# Patient Record
Sex: FEMALE | Race: WHITE | NOT HISPANIC OR LATINO | Employment: STUDENT | URBAN - METROPOLITAN AREA
[De-identification: names, ages, dates, MRNs, and addresses within clinical notes are randomized per-mention and may not be internally consistent; named-entity substitution may affect disease eponyms.]

---

## 2017-12-18 ENCOUNTER — ALLSCRIPTS OFFICE VISIT (OUTPATIENT)
Dept: OTHER | Facility: OTHER | Age: 22
End: 2017-12-18

## 2017-12-19 NOTE — PROGRESS NOTES
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)    Plan  Contraceptive surveillance    · Lessina 0 1-20 MG-MCG Oral Tablet; Take one tablet daily   Rx By: Crystal Enrique; Dispense: 0 Days ; #:84 Tablet; Refill: 3;For: Contraceptive surveillance; KERRI = N; Sent To: [facility]/PHARMACY #[id number] Uterus didelphys    · 3 - Budinetz DO, Shane Christmas (Obstetrics/Gynecology) Co-Management  *  Status: Hold For -Scheduling  Requested for: 99QKF5946   Ordered; For: Uterus didelphys; Ordered By: Crystal Enrique Performed:  Due: 40KYY7086  are Referring to a non- Preferred Provider : Scheduling access issues  Care Summary provided  : Yes  Well female exam with routine gynecological exam    · Call [phone] if: You find a new or different kind of lump in your breast ;Status:Complete;   Done: 95XET1331 03:36PM   Ordered; For:Well female exam with routine gynecological exam; Ordered By:Kylah Shipley;   · Always use a seat belt and shoulder strap when riding or driving a motor vehicle  ;Status:Complete;   Done: 76OWZ3042 03:36PM   Ordered; For:Well female exam with routine gynecological exam; Ordered By:Kylah Shipley;   · Avoid foods that are most likely to contain mercury ; Status:Complete;   Done:36Igq3897 03:36PM   Ordered; For:Well female exam with routine gynecological exam; Ordered By:Kylah Shipley;   · Begin or continue regular aerobic exercise  Gradually work up to at least 3 sessions of 30minutes of exercise a week ; Status:Complete;   Done: 87NVN3111 03:36PM   Ordered; For:Well female exam with routine gynecological exam; Ordered By:Kylah Shipley;   · Brush your teeth 3 times a day and floss at least once a day ; Status:Complete;   Done:82Jpi5389 03:36PM   Ordered; For:Well female exam with routine gynecological exam; Ordered By:Kylah Shipley;   · Drink plenty of fluids ; Status:Complete;   Done: 03OSU2391 03:36PM   Ordered; For:Well female exam with routine gynecological exam; Ordered By:Kylah Shipley;   · Regular aerobic exercise can help reduce stress ; Status:Complete;   Done: 65UGB670152:05UG   Ordered; For:Well female exam with routine gynecological exam; Ordered By:Kylah Shipley;   · Stretch and warm up your muscles during the first 10 minutes , then cool down yourmuscles for the last 10 minutes of exercise ; Status:Complete;   Done: 30UUQ070434:99PB   Ordered; For:Well female exam with routine gynecological exam; Ordered By:Kylah Shipley;   · There are many ways to reduce your risk of catching or spreading a sexually transmittedInfection ; Status:Complete;   Done: 81PEM5570 03:36PM   Ordered; For:Well female exam with routine gynecological exam; Ordered By:Kylah Shipley;   · Use a sun block product with an SPF of 15 or more ; Status:Complete;   Done:65Irb7106 03:36PM   Ordered; For:Well female exam with routine gynecological exam; Ordered By:Kylah Shipley;   · Vitamins can help you get daily requirements that your diet may not be giving you  ;Status:Complete;   Done: 09XFU3919 03:36PM   Ordered; For:Well female exam with routine gynecological exam; Ordered By:Kylah Shipley;   · We encourage all of our patients to exercise regularly  30 minutes of exercise or physicalactivity five or more days a week is recommended for children and adults  ;Status:Complete;   Done: 26CSE0872 03:36PM   Ordered; For:Well female exam with routine gynecological exam; Ordered By:Kylah Shipley;   · We recommend regular contraceptive use to prevent an unplanned pregnancy  ;Status:Complete;   Done: 50UOL7523 03:36PM   Ordered; For:Well female exam with routine gynecological exam; Ordered By:Kylah Shipley;   · We recommend routine visits to a dentist ; Status:Complete;   Done: 56KQS846259:37HS   Ordered; For:Well female exam with routine gynecological exam; Ordered By:Kylah Shipley;   · We recommend that you follow these rules for gun safety ; Status:Complete;   Done:79Koa5235 03:36PM   Ordered; For:Well female exam with routine gynecological exam; Ordered By:Kylah Shipley;   · We recommend that you make the following changes in your diet to help prevent orreduce symptoms of PMS ; Status:Complete;   Done: 32PAF9743 03:36PM   Ordered; For:Well female exam with routine gynecological exam; Ordered By:Kylah Shipley;   · We recommend you eat foods that will give you 350 mg of magnesium each day  ;Status:Complete;   Done: 80PJL1680 03:36PM   Ordered; For:Well female exam with routine gynecological exam; Ordered By:Kylah Shipley;   · Follow-up visit in 1 year Evaluation and Treatment  Follow-up  Status: Hold For -Scheduling  Requested for: 74GIV6147   Ordered; For: Well female exam with routine gynecological exam; Ordered By: Melo Mosqueda Performed:  Due: 61IJH3211    Discussion/Summary  healthy adult female Currently, she eats an adequate diet and has an adequate exercise regimen  the risks and benefits of cervical cancer screening were discussed next cervical cancer screening is due 2019 Breast cancer screening: the risks and benefits of breast cancer screening were discussed and monthly self breast exam was advised  Advice and education were given regarding nutrition, aerobic exercise, reproductive health and contraception  Patient discussion: discussed with the patient  Schedule consultation with Dr Ilsa Brown regarding vaginal septum  Patient is able to Self-Care  Possible side effects of new medications were reviewed with the patient/guardian today  The treatment plan was reviewed with the patient/guardian   The patient/guardian understands and agrees with the treatment plan      Chief Complaint  pt here for yearly, would like to discuss Gardasil 9      History of Present Illness  GYN HM, Adult Female [facility] Sue: The patient is being seen for a health maintenance and yearly evaluation  The last health maintenance visit was 1 year(s) ago  General Health:  Lifestyle:  She exercises regularly  -- She does not use tobacco -- She denies alcohol use  -- She denies drug use  Reproductive health: the patient is premenopausal--   she reports no menstrual problems  Menstrual history: LMP: the last menstrual period was 12/05/2017  Recent menstrual periods: bleeding has been normal  The cycles have been regular  The duration of her recent periods has been regular  -- she uses contraception  For contraception, she uses oral contraception pills  -- she is sexually active  -- she denies prior pregnancies G 0  Screening: Cervical cancer screening includes a pap smear performed last year,-- no previous human papilloma virus screening-- and-- no previous colposcopy  Breast cancer screening includes no previous mammogram,-- a clinical breast exam performed last year-- and-- no breast self-exams  Colorectal cancer screening includes no previous colonoscopy  She hasn't been previously screened for colorectal cancer  Additional History:  Doing well on OCPs  Interested in [address]  Discussed risks and benefits  Handout given to patient  Patient will consider getting this in the near future, she is living in [place] for school so may get it done down there  Has uterine didelphys  Discussed this diagnosis as well as the vaginal septum  Given referral for Dr Ilsa Brown for evaluation for possible surgical intervention  Patient states intercourse is painful  Review of Systems   Constitutional: not feeling poorly-- and-- not feeling tired  Breasts: no complaints of breast pain, breast lump or nipple discharge  Gastrointestinal: no complaints of abdominal pain, no constipation, no nausea or diarrhea, no vomiting, no bloody stools    Genitourinary: no complaints of dysuria, no incontinence, no pelvic pain, no dysmenorrhea, no vaginal discharge or abnormal vaginal bleeding  Neurological: no headache  ROS reviewed  OB History  Pregnancy History (Brief):  Prior pregnancies: : 0  Para: Active Problems  1  Contraceptive surveillance (V25 40) (Z30 40)   2  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)   3  Well female exam with routine gynecological exam (V72 31) (Z01 419)    Past Medical History   · History of dysmenorrhea (V13 29) (Z87 42)   · History of menorrhagia (V13 29) (Z87 42)    The active problems and past medical history were reviewed and updated today  Surgical History    The surgical history was reviewed and updated today  Family History  Mother    · No pertinent family history  Grandparent    · Family history of cardiac disorder (V17 49) (Z82 49)   · Family history of cerebrovascular accident (V17 1) (Z82 3)   · Family history of diabetes mellitus (V18 0) (Z83 3)  Family History    · Family history of cardiac disorder (V17 49) (Z82 49)   · Family history of cerebrovascular accident (V17 1) (Z82 3)   · Family history of diabetes mellitus (V18 0) (Z83 3)    The family history was reviewed and updated today  Social History   · Exercises regularly   · Never a smoker   · No alcohol use   · No drug use  The social history was reviewed and updated today  Current Meds   1  Lessina 0 1-20 MG-MCG Oral Tablet; Take one tablet daily; Therapy: 32HVE5788 to (Last Rx:2017)  Requested for: 65GNR0395 Ordered    Allergies  1  No Known Drug Allergies  2  No Known Environmental Allergies   3  No Known Food Allergies    Vitals   Recorded: 89BKN1268 02:21PM   Heart Rate 886   Systolic 392   Diastolic 82   Height 5 ft 5 in   Weight 142 lb    BMI Calculated 23 63   BSA Calculated 1 71   LMP 74WKU7475   Pain Scale 0     Physical Exam   Constitutional  General appearance: No acute distress, well appearing and well nourished  Neck  Neck: Normal, supple, trachea midline, no masses  Thyroid: Normal, no thyromegaly     Pulmonary Respiratory effort: No increased work of breathing or signs of respiratory distress  Auscultation of lungs: Clear to auscultation  Cardiovascular  Auscultation of heart: Normal rate and rhythm, normal S1 and S2, no murmurs  Genitourinary  External genitalia: Normal and no lesions appreciated  -- Septum in the midline starting in the mid portion of the vagina  Urethra: Normal    Urethral meatus: Normal    Bladder: Normal, soft, non-tender and no prolapse or masses appreciated  -- Two cervices palpable, not well visualized  Uterus: Normal, non-tender, not enlarged, and no palpable masses  Adnexa/parametria: Normal, non-tender and no fullness or masses appreciated  -- No palpable masses  Chest  Breasts: Normal and no dimpling or skin changes noted  -- No palpable masses  Abdomen  Abdomen: Normal, non-tender, and no organomegaly noted  Liver and spleen: No hepatomegaly or splenomegaly  Lymphatic  Palpation of lymph nodes in neck, axillae, groin and/or other locations: No lymphadenopathy or masses noted     Psychiatric  Orientation to person, place, and time: Normal    Mood and affect: Normal        Signatures   Electronically signed by : Saundra Jerome DO; Dec 18 6330  3:37PM EST                       (Author)

## 2018-01-10 ENCOUNTER — TRANSCRIBE ORDERS (OUTPATIENT)
Dept: ADMINISTRATIVE | Facility: HOSPITAL | Age: 23
End: 2018-01-10

## 2018-01-10 DIAGNOSIS — Q51.10 DOUBLING OF UTERUS WITH DOUBLING OF CERVIX AND VAGINA WITHOUT OBSTRUCTION: Primary | ICD-10-CM

## 2018-01-12 ENCOUNTER — HOSPITAL ENCOUNTER (OUTPATIENT)
Dept: RADIOLOGY | Facility: HOSPITAL | Age: 23
Discharge: HOME | End: 2018-01-12
Payer: COMMERCIAL

## 2018-01-12 ENCOUNTER — GENERIC CONVERSION - ENCOUNTER (OUTPATIENT)
Dept: OTHER | Facility: OTHER | Age: 23
End: 2018-01-12

## 2018-01-12 DIAGNOSIS — Q51.10 DOUBLING OF UTERUS WITH DOUBLING OF CERVIX AND VAGINA WITHOUT OBSTRUCTION: ICD-10-CM

## 2018-01-12 PROCEDURE — A9585 GADOBUTROL INJECTION: HCPCS | Performed by: RADIOLOGY

## 2018-01-12 PROCEDURE — 76770 US EXAM ABDO BACK WALL COMP: CPT

## 2018-01-12 PROCEDURE — 72197 MRI PELVIS W/O & W/DYE: CPT

## 2018-01-12 RX ADMIN — GADOBUTROL 6 ML: 604.72 INJECTION INTRAVENOUS at 12:18

## 2018-01-15 NOTE — RESULT NOTES
Verified Results  (1) THIN PREP PAP WITH IMAGING 90LGL5274 85:53QJ Ramiro Zazueta Order Number: [id number]     Test Name Result Flag Reference   LAB AP CASE REPORT (Report)     Gynecologic Cytology Report            Case: [id number]                  Authorizing Provider: Leamon Barthel, DO     Collected:      12/15/2016 1342        First Screen:     MIRLANDE Hickman Received:      12/19/2016 1140        Specimen:  LIQUID-BASED PAP, SCREENING, Endocervical   LAB AP GYN PRIMARY INTERPRETATION      Negative for intraepithelial lesion or malignancy  Electronically signed by MIRLANDE Hickman on 12/24/2016 at 10:14 AM   LAB AP GYN SPECIMEN ADEQUACY      Satisfactory for evaluation  Endocervical/transformation zone component present  LAB AP GYN ADDITIONAL INFORMATION (Report)     [employer]'s FDA approved ,  and ThinPrep Imaging System are   utilized with strict adherence to the 's instruction manual to   prepare gynecologic and non-gynecologic cytology specimens for the   production of ThinPrep slides as well as for gynecologic ThinPrep imaging  These processes have been validated by our laboratory and/or by the     The Pap test is not a diagnostic procedure and should not be used as the   sole means to detect cervical cancer  It is only a screening procedure to   aid in the detection of cervical cancer and its precursors  Both   false-negative and false-positive results have been experienced  Your   patient's test result should be interpreted in this context together with   the history and clinical findings

## 2018-01-23 VITALS
DIASTOLIC BLOOD PRESSURE: 82 MMHG | HEIGHT: 65 IN | WEIGHT: 142 LBS | HEART RATE: 107 BPM | SYSTOLIC BLOOD PRESSURE: 126 MMHG | BODY MASS INDEX: 23.66 KG/M2

## 2018-02-05 DIAGNOSIS — Z30.41 ENCOUNTER FOR SURVEILLANCE OF CONTRACEPTIVE PILLS: Primary | ICD-10-CM

## 2018-02-05 RX ORDER — LEVONORGESTREL AND ETHINYL ESTRADIOL 0.1-0.02MG
KIT ORAL
Qty: 84 TABLET | Refills: 3 | Status: SHIPPED | OUTPATIENT
Start: 2018-02-05 | End: 2018-07-10

## 2018-07-10 ENCOUNTER — OFFICE VISIT (OUTPATIENT)
Dept: OBGYN CLINIC | Facility: CLINIC | Age: 23
End: 2018-07-10
Payer: COMMERCIAL

## 2018-07-10 VITALS — BODY MASS INDEX: 25.09 KG/M2 | DIASTOLIC BLOOD PRESSURE: 72 MMHG | WEIGHT: 150.8 LBS | SYSTOLIC BLOOD PRESSURE: 104 MMHG

## 2018-07-10 DIAGNOSIS — R87.622 LOW GRADE SQUAMOUS INTRAEPITHELIAL LESION ON CYTOLOGIC SMEAR OF VAGINA (LGSIL): ICD-10-CM

## 2018-07-10 DIAGNOSIS — Q51.28 UTERUS DIDELPHUS: Primary | ICD-10-CM

## 2018-07-10 PROCEDURE — 99214 OFFICE O/P EST MOD 30 MIN: CPT | Performed by: OBSTETRICS & GYNECOLOGY

## 2018-07-10 PROCEDURE — 88175 CYTOPATH C/V AUTO FLUID REDO: CPT | Performed by: OBSTETRICS & GYNECOLOGY

## 2018-07-10 NOTE — PROGRESS NOTES
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)         Plan  Pap x 2 collected today  Will call with results  Discussed HPV vaccine  Patient is considering  She might start series in [place] where she is moving in August to get a job  Just graduated from college  Ofelia Trevino is a 25 y o  female here for a follow up visit s/p resection of vaginal septum  Pathology report from vaginal septum revealed LSIL of vagina  Patient here to discuss these findings  Her surgery was   We discussed performing pap smear of each cervix, patient agreeable and would like to have Pap smears done today  She is still sore from her surgery but is feeling better  Patient Active Problem List   Diagnosis    Uterus didelphys         Gynecologic History  Patient's last menstrual period was 2018  Contraception: none  Last Pap: 2016  Results were: normal;       Obstetric History  OB History    Para Term  AB Living   0 0 0 0 0 0   SAB TAB Ectopic Multiple Live Births   0 0 0 0 0             Past Medical/Surgical/Family/Social History  The following portions of the patient's history were reviewed and updated as appropriate: allergies, current medications, past family history, past medical history, past social history, past surgical history and problem list     Allergies  Patient has no known allergies  Medications  No current outpatient prescriptions on file        Review of Systems  Constitutional: no fever, feels well, no tiredness, no recent weight gain or loss  Breasts:no complaints of breast pain, breast lump, or nipple discharge  Gastrointestinal: no complaints of abdominal pain, constipation, nausea, vomiting, or diarrhea or bloody stools  Genitourinary : no complaints of dysuria, incontinence, pelvic pain, dysmenorrhea,vaginal discharge or abnormal vaginal bleeding       Objective     /72   Wt 68 4 kg (150 lb 12 8 oz)   LMP 07/07/2018   Breastfeeding? No   BMI 25 09 kg/m²     General: alert and oriented, in no acute distress  Abdomen:soft and nontender  Vulva: normal  Vagina: normal vaingal epithelium  No lesions  Cervix:  2 cervices identified, right small and somewhat hidden, right appears normal  Pap collected from each cervix

## 2018-07-17 LAB
LAB AP GYN PRIMARY INTERPRETATION: NORMAL
Lab: NORMAL

## 2018-07-18 ENCOUNTER — TELEPHONE (OUTPATIENT)
Dept: OBGYN CLINIC | Facility: CLINIC | Age: 23
End: 2018-07-18

## 2018-07-18 NOTE — TELEPHONE ENCOUNTER
Pt calling for 2 pap smear results that were collected on 7/10/18, not resulted yet, informed pt that it usually take 14 days and that Dr Davion Hilton does have a note to call her about these results   Pt verbalized understanding

## 2018-07-19 LAB
LAB AP GYN PRIMARY INTERPRETATION: NORMAL
Lab: NORMAL

## 2018-08-28 ENCOUNTER — TELEPHONE (OUTPATIENT)
Dept: OBGYN CLINIC | Facility: CLINIC | Age: 23
End: 2018-08-28

## 2018-08-28 DIAGNOSIS — B37.3 CANDIDA VAGINITIS: Primary | ICD-10-CM

## 2018-08-28 RX ORDER — FLUCONAZOLE 150 MG/1
150 TABLET ORAL ONCE
Qty: 1 TABLET | Refills: 0 | Status: SHIPPED | OUTPATIENT
Start: 2018-08-28 | End: 2018-08-28

## 2019-04-05 DIAGNOSIS — IMO0001 CONTRACEPTION: Primary | ICD-10-CM

## 2019-04-05 RX ORDER — LEVONORGESTREL AND ETHINYL ESTRADIOL 0.1-0.02MG
KIT ORAL
Qty: 84 TABLET | Refills: 2 | Status: SHIPPED | OUTPATIENT
Start: 2019-04-05 | End: 2019-09-02 | Stop reason: SDUPTHER

## 2019-09-02 DIAGNOSIS — IMO0001 CONTRACEPTION: ICD-10-CM

## 2019-09-03 RX ORDER — LEVONORGESTREL AND ETHINYL ESTRADIOL 0.1-0.02MG
1 KIT ORAL DAILY
Qty: 84 TABLET | Refills: 0 | Status: SHIPPED | OUTPATIENT
Start: 2019-09-03 | End: 2019-11-01 | Stop reason: SDUPTHER

## 2019-09-06 ENCOUNTER — TELEPHONE (OUTPATIENT)
Dept: OBGYN CLINIC | Facility: CLINIC | Age: 24
End: 2019-09-06

## 2019-09-06 NOTE — TELEPHONE ENCOUNTER
Patient calling to see if refill has been sent to pharmacy - advised it was sent on 9/3/19 [facility] in [place], [place]  Please keep appt for annual exam  Verbalized understanding

## 2019-11-01 ENCOUNTER — ANNUAL EXAM (OUTPATIENT)
Dept: OBGYN CLINIC | Facility: CLINIC | Age: 24
End: 2019-11-01
Payer: COMMERCIAL

## 2019-11-01 VITALS
SYSTOLIC BLOOD PRESSURE: 114 MMHG | WEIGHT: 160.2 LBS | BODY MASS INDEX: 25.75 KG/M2 | HEIGHT: 66 IN | DIASTOLIC BLOOD PRESSURE: 66 MMHG

## 2019-11-01 DIAGNOSIS — Z11.3 SCREENING FOR STDS (SEXUALLY TRANSMITTED DISEASES): ICD-10-CM

## 2019-11-01 DIAGNOSIS — Z01.419 WOMEN'S ANNUAL ROUTINE GYNECOLOGICAL EXAMINATION: Primary | ICD-10-CM

## 2019-11-01 DIAGNOSIS — Z30.41 ENCOUNTER FOR SURVEILLANCE OF CONTRACEPTIVE PILLS: ICD-10-CM

## 2019-11-01 PROCEDURE — 99395 PREV VISIT EST AGE 18-39: CPT | Performed by: OBSTETRICS & GYNECOLOGY

## 2019-11-01 RX ORDER — LEVONORGESTREL AND ETHINYL ESTRADIOL 0.1-0.02MG
1 KIT ORAL DAILY
Qty: 84 TABLET | Refills: 3 | Status: SHIPPED | OUTPATIENT
Start: 2019-11-01 | End: 2019-12-06 | Stop reason: SDUPTHER

## 2019-11-01 NOTE — PROGRESS NOTES
ASSESSMENT & PLAN:     Diagnoses and all orders for this visit:    Women's annual routine gynecological examination    Screening for STDs (sexually transmitted diseases)  -     Chlamydia/GC amplified DNA by PCR, urine  Patient will do at outpatient lab  The following were reviewed in today's visit: ASCCP guidelines for pap testing, Gardasil vaccination, breast self exam, use and side effects of OCPs, family planning choices, exercise and healthy diet  Patient plans to get HPV vaccination  All questions have been answered to her satisfaction  Patient to return to office yearly for annual exam      CC:  Annual Gynecologic Examination    HPI: Esteban Triana is a 25 y o  [place] who presents for annual gynecologic examination  She has the following concerns:  None  Health Maintenance:    She exercises 4 days per week  She wears her seatbelt routinely  She does not perform monthly self breast exams  She feels safe at home  Patient tries to follow a balanced diet  History reviewed  No pertinent past medical history  Past Surgical History:   Procedure Laterality Date    HYSTEROSCOPY      VAGINAL SEPTUM RESECTION  2018    WISDOM TOOTH EXTRACTION         Menstrual History:  OB History        0    Para   0    Term   0       0    AB   0    Living   0       SAB   0    TAB   0    Ectopic   0    Multiple   0    Live Births   0                  Patient's last menstrual period was 10/28/2019  Period Cycle (Days): 28  Period Duration (Days): 5-7  Period Pattern: Regular  Menstrual Flow: Light, Moderate  Menstrual Control: Panty liner, Thin pad, Maxi pad, Tampon  Menstrual Control Change Freq (Hours): 4-6  Dysmenorrhea: (!) Moderate  Dysmenorrhea Symptoms: Cramping    History of sexually transmitted infection No  Patient is currently sexually active  heterosexual Birth control: condoms  Last Pap  2018  :  no abnormalities    Gardisil vaccination: no    Family History Problem Relation Age of Onset    Hypertension Father     Diabetes Paternal Grandmother     Diabetes Paternal Grandfather     Ovarian cancer Maternal Aunt     No Known Problems Mother        Social History:  Social History     Socioeconomic History    Marital status: Single     Spouse name: Not on file    Number of children: Not on file    Years of education: Not on file    Highest education level: Not on file   Occupational History    Not on file   Social Needs    Financial resource strain: Not on file    Food insecurity:     Worry: Not on file     Inability: Not on file    Transportation needs:     Medical: Not on file     Non-medical: Not on file   Tobacco Use    Smoking status: Never Smoker    Smokeless tobacco: Never Used   Substance and Sexual Activity    Alcohol use: Yes     Alcohol/week: 3 0 - 5 0 standard drinks     Types: 3 - 5 Standard drinks or equivalent per week    Drug use: No    Sexual activity: Yes     Partners: Male     Birth control/protection: None   Lifestyle    Physical activity:     Days per week: Not on file     Minutes per session: Not on file    Stress: Not on file   Relationships    Social connections:     Talks on phone: Not on file     Gets together: Not on file     Attends Rastafarian service: Not on file     Active member of club or organization: Not on file     Attends meetings of clubs or organizations: Not on file     Relationship status: Not on file    Intimate partner violence:     Fear of current or ex partner: Not on file     Emotionally abused: Not on file     Physically abused: Not on file     Forced sexual activity: Not on file   Other Topics Concern    Not on file   Social History Narrative    Not on file     Presently lives with roommate  Patient is monogamous  Patient is currently employed       No Known Allergies    Current Outpatient Medications:     levonorgestrel-ethinyl estradiol (SRONYX) 0 1-20 MG-MCG per tablet, Take 1 tablet by mouth daily, Disp: 84 tablet, Rfl: 3    Review of Systems:  Review of Systems   Constitutional: Negative for unexpected weight change  Respiratory: Negative for shortness of breath  Cardiovascular: Negative for chest pain  Gastrointestinal: Negative for abdominal distention, abdominal pain, blood in stool, constipation, nausea and vomiting  Genitourinary: Negative for difficulty urinating, dysuria, frequency, menstrual problem, pelvic pain, vaginal bleeding, vaginal discharge and vaginal pain  Neurological: Negative for headaches  Physical Exam:  /66 (BP Location: Right arm, Patient Position: Sitting, Cuff Size: Standard)   Ht 5' 6" (1 676 m)   Wt 72 7 kg (160 lb 3 2 oz)   LMP 10/28/2019   BMI 25 86 kg/m²    Physical Exam   Constitutional: She is oriented to person, place, and time  Vital signs are normal  She appears well-developed and well-nourished  Genitourinary: Vagina normal and uterus normal  Pelvic exam was performed with patient supine  There is no rash, tenderness or lesion on the right labia  There is no rash, tenderness or lesion on the left labia  Vagina exhibits rugosity  No tenderness or bleeding in the vagina  No vaginal discharge found  Right adnexum does not display mass, does not display tenderness and does not display fullness  Left adnexum does not display mass, does not display tenderness and does not display fullness  Cervix is nulliparous  Cervix does not exhibit motion tenderness, lesion, discharge or polyp  Uterus is mobile  Uterus is not enlarged, tender or irregular (is regular)  Genitourinary Comments: No bladder tenderness     HENT:   Head: Normocephalic  Neck: No thyromegaly present  Cardiovascular: Normal rate, regular rhythm and normal heart sounds  Pulmonary/Chest: Effort normal and breath sounds normal  No respiratory distress  Right breast exhibits no inverted nipple, no mass, no nipple discharge, no skin change and no tenderness   Left breast exhibits no inverted nipple, no mass, no nipple discharge, no skin change and no tenderness  Abdominal: Soft  She exhibits no distension  There is no tenderness  Neurological: She is alert and oriented to person, place, and time  Psychiatric: She has a normal mood and affect  Her behavior is normal    Vitals reviewed

## 2019-12-06 DIAGNOSIS — Z30.41 ENCOUNTER FOR SURVEILLANCE OF CONTRACEPTIVE PILLS: ICD-10-CM

## 2019-12-26 RX ORDER — LEVONORGESTREL AND ETHINYL ESTRADIOL 0.1-0.02MG
1 KIT ORAL DAILY
Qty: 84 TABLET | Refills: 3 | Status: SHIPPED | OUTPATIENT
Start: 2019-12-26 | End: 2021-02-17 | Stop reason: SDUPTHER

## 2020-02-04 DIAGNOSIS — Z30.41 ENCOUNTER FOR SURVEILLANCE OF CONTRACEPTIVE PILLS: ICD-10-CM

## 2020-02-04 RX ORDER — LEVONORGESTREL AND ETHINYL ESTRADIOL 0.1-0.02MG
1 KIT ORAL DAILY
Qty: 84 TABLET | Refills: 0 | Status: CANCELLED | OUTPATIENT
Start: 2020-02-04 | End: 2021-01-05

## 2020-02-05 NOTE — TELEPHONE ENCOUNTER
Left detailed VM for pt informing her to call [facility] , has a year supply of refills given by Dr Gladys Stanley in 12/2019  If needing refills transferred [facility] a different [facility] closer to home, [facility] and [facility]

## 2021-02-17 ENCOUNTER — ANNUAL EXAM (OUTPATIENT)
Dept: OBGYN CLINIC | Facility: CLINIC | Age: 26
End: 2021-02-17
Payer: COMMERCIAL

## 2021-02-17 VITALS — BODY MASS INDEX: 26.79 KG/M2 | DIASTOLIC BLOOD PRESSURE: 66 MMHG | WEIGHT: 166 LBS | SYSTOLIC BLOOD PRESSURE: 114 MMHG

## 2021-02-17 DIAGNOSIS — Z30.41 ENCOUNTER FOR SURVEILLANCE OF CONTRACEPTIVE PILLS: ICD-10-CM

## 2021-02-17 DIAGNOSIS — Z01.419 WELL WOMAN EXAM WITH ROUTINE GYNECOLOGICAL EXAM: Primary | ICD-10-CM

## 2021-02-17 DIAGNOSIS — Z12.4 SCREENING FOR MALIGNANT NEOPLASM OF CERVIX: ICD-10-CM

## 2021-02-17 PROCEDURE — 99395 PREV VISIT EST AGE 18-39: CPT | Performed by: OBSTETRICS & GYNECOLOGY

## 2021-02-17 PROCEDURE — G0145 SCR C/V CYTO,THINLAYER,RESCR: HCPCS | Performed by: OBSTETRICS & GYNECOLOGY

## 2021-02-17 RX ORDER — LEVONORGESTREL AND ETHINYL ESTRADIOL 0.1-0.02MG
1 KIT ORAL DAILY
Qty: 84 TABLET | Refills: 3 | Status: SHIPPED | OUTPATIENT
Start: 2021-02-17 | End: 2021-05-12 | Stop reason: SDUPTHER

## 2021-02-17 NOTE — PATIENT INSTRUCTIONS
If interested in Gardasil vaccination, please contact the office to schedule an appointment  Here is some information regarding vaccine  [email or web address]  pdf

## 2021-02-17 NOTE — PROGRESS NOTES
ASSESSMENT & PLAN:   Diagnoses and all orders for this visit:    Well woman exam with routine gynecological exam  Screening for malignant neoplasm of cervix  -     Liquid-based pap, screening    Encounter for surveillance of contraceptive pills  -     levonorgestrel-ethinyl estradiol (Sronyx) 0 1-20 MG-MCG per tablet; Take 1 tablet by mouth daily        The following were reviewed in today's visit: ASCCP guidelines for pap testing, Gardasil vaccination, breast self exam, use and side effects of OCPs, family planning choices, exercise and healthy diet  All questions have been answered to her satisfaction  Patient to return to office yearly for annual exam      CC:  Annual Gynecologic Examination    HPI: Candace Villasenor is a 22 y o  Melina Shoulder who presents for annual gynecologic examination  She has the following concerns:  Refills on OCPs  Health Maintenance:    She exercises 4 days per week  Recommend at least 150 minutes a week of moderate intensity activity such as brisk walking and at least 2 days a week of activities that strengthen muscles  She wears her seatbelt routinely  She does not perform self breast exams  Patient does try to follow a balanced diet  History reviewed  No pertinent past medical history  Past Surgical History:   Procedure Laterality Date    HYSTEROSCOPY      VAGINAL SEPTUM RESECTION  2018    WISDOM TOOTH EXTRACTION         Past OB/Gyn History:    Menstrual History:  OB History        0    Para   0    Term   0       0    AB   0    Living   0       SAB   0    TAB   0    Ectopic   0    Multiple   0    Live Births   0                  Patient's last menstrual period was 2021 (exact date)    Period Cycle (Days): 28  Period Duration (Days): 5-7  Period Pattern: Regular  Menstrual Flow: Moderate  Dysmenorrhea: (!) Moderate  Dysmenorrhea Symptoms: Cramping, Throbbing    History of sexually transmitted infection No  Patient is not interested in STI testing today  Patient is not currently sexually active  heterosexual   Birth control: combination OCPs  Last Pap  2018 : NILM  Gardisil vaccination: no    Family History   Problem Relation Age of Onset    Hypertension Father     Diabetes Paternal Grandmother     Diabetes Paternal Grandfather     Ovarian cancer Maternal Aunt     No Known Problems Mother        Social History:  Social History     Socioeconomic History    Marital status: Single     Spouse name: Not on file    Number of children: Not on file    Years of education: Not on file    Highest education level: Not on file   Occupational History    Not on file   Social Needs    Financial resource strain: Not on file    Food insecurity     Worry: Not on file     Inability: Not on file    Transportation needs     Medical: Not on file     Non-medical: Not on file   Tobacco Use    Smoking status: Never Smoker    Smokeless tobacco: Never Used   Substance and Sexual Activity    Alcohol use: Yes     Alcohol/week: 3 0 - 5 0 standard drinks     Types: 3 - 5 Standard drinks or equivalent per week    Drug use: No    Sexual activity: Not Currently     Partners: Male   Lifestyle    Physical activity     Days per week: Not on file     Minutes per session: Not on file    Stress: Not on file   Relationships    Social connections     Talks on phone: Not on file     Gets together: Not on file     Attends Tenriism service: Not on file     Active member of club or organization: Not on file     Attends meetings of clubs or organizations: Not on file     Relationship status: Not on file    Intimate partner violence     Fear of current or ex partner: Not on file     Emotionally abused: Not on file     Physically abused: Not on file     Forced sexual activity: Not on file   Other Topics Concern    Not on file   Social History Narrative    Not on file     Presently lives with mom & dad  She feels safe at home  Patient is single      Patient is currently employed  No Known Allergies    Current Outpatient Medications:     levonorgestrel-ethinyl estradiol (Sronyx) 0 1-20 MG-MCG per tablet, Take 1 tablet by mouth daily, Disp: 84 tablet, Rfl: 3    Review of Systems:  Review of Systems   Constitutional: Negative for chills, fever and unexpected weight change  Respiratory: Negative for cough and shortness of breath  Cardiovascular: Negative for chest pain and palpitations  Gastrointestinal: Negative for abdominal distention, abdominal pain, blood in stool, constipation, nausea and vomiting  Genitourinary: Negative for difficulty urinating, dysuria, frequency, menstrual problem, pelvic pain, urgency, vaginal bleeding, vaginal discharge and vaginal pain  Neurological: Negative for headaches  Physical Exam:  /66   Wt 75 3 kg (166 lb)   LMP 02/07/2021 (Exact Date)   Breastfeeding No   BMI 26 79 kg/m²    Physical Exam  Constitutional:       General: She is awake  Appearance: Normal appearance  She is well-developed  Genitourinary:      Pelvic exam was performed with patient in the lithotomy position  Vulva, urethra, bladder, vagina and uterus normal       No vulval lesion, ulcerations or rash noted  No urethral prolapse present  Bladder is not distended or tender  No vaginal discharge, erythema, tenderness, bleeding, atrophy or prolapse  Cervix is nulliparous  Cervical bleeding (with pap ) and eversion present  No cervical motion tenderness, discharge, lesion or polyp  Uterus is not enlarged, tender, irregular or mobile  No uterine mass detected  Uterus is regular  No right or left adnexal mass present  Right adnexa not tender or full  Left adnexa not tender or full  HENT:      Head: Normocephalic and atraumatic  Neck:      Musculoskeletal: Neck supple  Cardiovascular:      Rate and Rhythm: Normal rate and regular rhythm        Heart sounds: Normal heart sounds  Pulmonary:      Effort: Pulmonary effort is normal  No tachypnea or respiratory distress  Breath sounds: Normal breath sounds  Chest:      Breasts:         Right: Normal  No inverted nipple, mass, nipple discharge, skin change or tenderness  Left: Normal  No inverted nipple, mass, nipple discharge, skin change or tenderness  Abdominal:      General: There is no distension  Palpations: Abdomen is soft  Tenderness: There is no abdominal tenderness  There is no guarding  Lymphadenopathy:      Upper Body:      Right upper body: No supraclavicular or axillary adenopathy  Left upper body: No supraclavicular or axillary adenopathy  Neurological:      General: No focal deficit present  Mental Status: She is alert and oriented to person, place, and time  Psychiatric:         Mood and Affect: Mood normal          Behavior: Behavior normal          Thought Content: Thought content normal          Judgment: Judgment normal    Vitals signs reviewed

## 2021-02-22 LAB
LAB AP GYN PRIMARY INTERPRETATION: NORMAL
Lab: NORMAL

## 2021-05-12 DIAGNOSIS — Z30.41 ENCOUNTER FOR SURVEILLANCE OF CONTRACEPTIVE PILLS: ICD-10-CM

## 2021-05-13 RX ORDER — LEVONORGESTREL AND ETHINYL ESTRADIOL 0.1-0.02MG
1 KIT ORAL DAILY
Qty: 84 TABLET | Refills: 3 | Status: SHIPPED | OUTPATIENT
Start: 2021-05-13 | End: 2021-12-27 | Stop reason: SDUPTHER

## 2021-06-08 ENCOUNTER — CLINICAL SUPPORT (OUTPATIENT)
Dept: OBGYN CLINIC | Facility: CLINIC | Age: 26
End: 2021-06-08
Payer: COMMERCIAL

## 2021-06-08 VITALS — BODY MASS INDEX: 26.79 KG/M2 | WEIGHT: 166 LBS | SYSTOLIC BLOOD PRESSURE: 122 MMHG | DIASTOLIC BLOOD PRESSURE: 74 MMHG

## 2021-06-08 DIAGNOSIS — Z23 NEED FOR HPV VACCINATION: Primary | ICD-10-CM

## 2021-06-08 PROCEDURE — 90471 IMMUNIZATION ADMIN: CPT | Performed by: OBSTETRICS & GYNECOLOGY

## 2021-06-08 PROCEDURE — 90651 9VHPV VACCINE 2/3 DOSE IM: CPT | Performed by: OBSTETRICS & GYNECOLOGY

## 2021-06-08 NOTE — PROGRESS NOTES
Pt presents for 1 gardasil vaccine  Pt has no problems or concerns  Patient feels faint with injections, had patient lay down for injection  Gardasil given in left deltoid without difficulty, pt tolerated well  RTO 2 mos for next injection       Patient sat in office for 10 minutes after injection, no reaction

## 2021-07-23 DIAGNOSIS — Z30.41 ENCOUNTER FOR SURVEILLANCE OF CONTRACEPTIVE PILLS: ICD-10-CM

## 2021-07-23 RX ORDER — LEVONORGESTREL AND ETHINYL ESTRADIOL 0.1-0.02MG
1 KIT ORAL DAILY
Qty: 84 TABLET | Refills: 0 | Status: CANCELLED | OUTPATIENT
Start: 2021-07-23 | End: 2022-06-24

## 2021-08-01 DIAGNOSIS — Z30.41 ENCOUNTER FOR SURVEILLANCE OF CONTRACEPTIVE PILLS: ICD-10-CM

## 2021-08-01 RX ORDER — LEVONORGESTREL AND ETHINYL ESTRADIOL 0.1-0.02MG
1 KIT ORAL DAILY
Qty: 84 TABLET | Refills: 0 | Status: CANCELLED | OUTPATIENT
Start: 2021-08-01 | End: 2022-07-03

## 2021-09-10 ENCOUNTER — CLINICAL SUPPORT (OUTPATIENT)
Dept: OBGYN CLINIC | Facility: CLINIC | Age: 26
End: 2021-09-10
Payer: COMMERCIAL

## 2021-09-10 VITALS — SYSTOLIC BLOOD PRESSURE: 120 MMHG | DIASTOLIC BLOOD PRESSURE: 50 MMHG | WEIGHT: 164.4 LBS | BODY MASS INDEX: 26.53 KG/M2

## 2021-09-10 DIAGNOSIS — Z23 NEED FOR HPV VACCINE: Primary | ICD-10-CM

## 2021-09-10 PROCEDURE — 90471 IMMUNIZATION ADMIN: CPT | Performed by: OBSTETRICS & GYNECOLOGY

## 2021-09-10 PROCEDURE — 90651 9VHPV VACCINE 2/3 DOSE IM: CPT | Performed by: OBSTETRICS & GYNECOLOGY

## 2021-12-27 DIAGNOSIS — Z30.41 ENCOUNTER FOR SURVEILLANCE OF CONTRACEPTIVE PILLS: ICD-10-CM

## 2021-12-27 RX ORDER — LEVONORGESTREL AND ETHINYL ESTRADIOL 0.1-0.02MG
1 KIT ORAL DAILY
Qty: 84 TABLET | Refills: 0 | Status: SHIPPED | OUTPATIENT
Start: 2021-12-27 | End: 2022-03-14

## 2022-01-14 ENCOUNTER — CLINICAL SUPPORT (OUTPATIENT)
Dept: OBGYN CLINIC | Facility: CLINIC | Age: 27
End: 2022-01-14
Payer: COMMERCIAL

## 2022-01-14 VITALS — SYSTOLIC BLOOD PRESSURE: 124 MMHG | BODY MASS INDEX: 28.5 KG/M2 | DIASTOLIC BLOOD PRESSURE: 84 MMHG | WEIGHT: 176.6 LBS

## 2022-01-14 DIAGNOSIS — Z23 NEED FOR HPV VACCINE: Primary | ICD-10-CM

## 2022-01-14 PROCEDURE — 90471 IMMUNIZATION ADMIN: CPT

## 2022-01-14 PROCEDURE — 90651 9VHPV VACCINE 2/3 DOSE IM: CPT

## 2022-03-12 DIAGNOSIS — Z30.41 ENCOUNTER FOR SURVEILLANCE OF CONTRACEPTIVE PILLS: ICD-10-CM

## 2022-03-14 RX ORDER — LEVONORGESTREL AND ETHINYL ESTRADIOL 0.1-0.02MG
KIT ORAL
Qty: 84 TABLET | Refills: 0 | Status: SHIPPED | OUTPATIENT
Start: 2022-03-14 | End: 2022-06-13

## 2022-06-13 DIAGNOSIS — Z30.41 ENCOUNTER FOR SURVEILLANCE OF CONTRACEPTIVE PILLS: ICD-10-CM

## 2022-06-13 RX ORDER — LEVONORGESTREL AND ETHINYL ESTRADIOL 0.1-0.02MG
KIT ORAL
Qty: 84 TABLET | Refills: 0 | Status: SHIPPED | OUTPATIENT
Start: 2022-06-13

## 2022-09-14 DIAGNOSIS — Z30.41 ENCOUNTER FOR SURVEILLANCE OF CONTRACEPTIVE PILLS: ICD-10-CM

## 2022-09-15 RX ORDER — LEVONORGESTREL AND ETHINYL ESTRADIOL 0.1-0.02MG
KIT ORAL
Qty: 84 TABLET | Refills: 0 | Status: SHIPPED | OUTPATIENT
Start: 2022-09-15 | End: 2022-10-05 | Stop reason: SDUPTHER

## 2022-10-05 ENCOUNTER — ANNUAL EXAM (OUTPATIENT)
Dept: OBGYN CLINIC | Facility: CLINIC | Age: 27
End: 2022-10-05
Payer: COMMERCIAL

## 2022-10-05 VITALS
BODY MASS INDEX: 25.23 KG/M2 | HEIGHT: 66 IN | SYSTOLIC BLOOD PRESSURE: 118 MMHG | DIASTOLIC BLOOD PRESSURE: 68 MMHG | WEIGHT: 157 LBS

## 2022-10-05 DIAGNOSIS — Z11.3 SCREENING FOR STD (SEXUALLY TRANSMITTED DISEASE): ICD-10-CM

## 2022-10-05 DIAGNOSIS — Z30.41 ENCOUNTER FOR SURVEILLANCE OF CONTRACEPTIVE PILLS: ICD-10-CM

## 2022-10-05 DIAGNOSIS — Z01.419 WOMEN'S ANNUAL ROUTINE GYNECOLOGICAL EXAMINATION: Primary | ICD-10-CM

## 2022-10-05 PROCEDURE — 99395 PREV VISIT EST AGE 18-39: CPT | Performed by: OBSTETRICS & GYNECOLOGY

## 2022-10-05 RX ORDER — LEVONORGESTREL AND ETHINYL ESTRADIOL 0.1-0.02MG
1 KIT ORAL DAILY
Qty: 84 TABLET | Refills: 3 | Status: SHIPPED | OUTPATIENT
Start: 2022-10-05

## 2022-10-05 RX ORDER — OMEPRAZOLE 40 MG/1
CAPSULE, DELAYED RELEASE ORAL
COMMUNITY
Start: 2022-09-28

## 2022-10-05 RX ORDER — FAMOTIDINE 40 MG/1
TABLET, FILM COATED ORAL
COMMUNITY
Start: 2022-09-28

## 2022-10-05 NOTE — PROGRESS NOTES
ASSESSMENT & PLAN:   Diagnoses and all orders for this visit:    Women's annual routine gynecological examination    Encounter for surveillance of contraceptive pills  -     levonorgestrel-ethinyl estradiol [place]) 0 1-20 MG-MCG per tablet; Take 1 tablet by mouth daily    Screening for STD (sexually transmitted disease)  -     Chlamydia/GC amplified DNA by PCR; Future    Other orders  -     omeprazole (PriLOSEC) 40 MG capsule  -     famotidine (PEPCID) 40 MG tablet          The following were reviewed in today's visit: ASCCP guidelines, Gardisil vaccination, STD testing breast self exam, STD testing, use and side effects of OCPs, exercise and healthy diet  Patient to return to office in yearly for annual exam      All questions have been answered to her satisfaction  CC:  Annual Gynecologic Examination  Chief Complaint   Patient presents with    Gynecologic Exam     Pt is here for her annual exam; last pap was 2/17/2021 neg/neg HPV         HPI: Libby Hollis is a 32 y o  You Flock who presents for annual gynecologic examination  She has the following concerns:  Refills sent on birth control  Health Maintenance:    Exercise: frequently  Breast exams/breast awareness: no  Diet: well balanced diet      Past Medical History:   Diagnosis Date    Acid reflux        Past Surgical History:   Procedure Laterality Date    HYSTEROSCOPY      VAGINAL SEPTUM RESECTION  06/04/2018    WISDOM TOOTH EXTRACTION         Past OB/Gyn History:  Period Cycle (Days): 30  Period Duration (Days): 4-6  Period Pattern: Regular  Menstrual Flow: Moderate, Light  Dysmenorrhea: (!) Mild  Dysmenorrhea Symptoms: Cramping, Nausea, HeadachePatient's last menstrual period was 10/03/2022  Last Pap: 2021 : no abnormalities  History of abnormal Pap smear: no  HPV vaccine completed: yes    Patient is currently sexually active     STD testing: yes - ordered   Current contraception: OCP (estrogen/progesterone)      Family History  Family History   Problem Relation Age of Onset    No Known Problems Mother     Hypertension Father     Diabetes Paternal Grandmother     Diabetes Paternal Grandfather     Ovarian cancer Maternal Aunt        Family history of uterine or ovarian cancer: yes  Family history of breast cancer: non  Family history of colon cancer: no    Social History:  Social History     Socioeconomic History    Marital status: Single     Spouse name: Not on file    Number of children: Not on file    Years of education: Not on file    Highest education level: Not on file   Occupational History    Not on file   Tobacco Use    Smoking status: Never Smoker    Smokeless tobacco: Never Used   Vaping Use    Vaping Use: Never used   Substance and Sexual Activity    Alcohol use: Yes     Alcohol/week: 3 0 - 5 0 standard drinks     Types: 3 - 5 Standard drinks or equivalent per week     Comment: occasional    Drug use: No    Sexual activity: Yes     Partners: Male     Birth control/protection: OCP   Other Topics Concern    Not on file   Social History Narrative    Not on file     Social Determinants of Health     Financial Resource Strain: Not on file   Food Insecurity: Not on file   Transportation Needs: Not on file   Physical Activity: Not on file   Stress: Not on file   Social Connections: Not on file   Intimate Partner Violence: Not on file   Housing Stability: Not on file     Domestic violence screen: negative    Allergies:  No Known Allergies    Medications:    Current Outpatient Medications:     famotidine (PEPCID) 40 MG tablet, , Disp: , Rfl:     levonorgestrel-ethinyl estradiol (Larissia) 0 1-20 MG-MCG per tablet, Take 1 tablet by mouth daily, Disp: 84 tablet, Rfl: 3    omeprazole (PriLOSEC) 40 MG capsule, , Disp: , Rfl:     Review of Systems:  Review of Systems   Constitutional: Negative for chills and fever  Respiratory: Negative for cough and shortness of breath      Cardiovascular: Negative for chest pain and palpitations  Gastrointestinal: Negative for abdominal distention, abdominal pain, blood in stool, constipation, nausea and vomiting  Genitourinary: Positive for frequency  Negative for difficulty urinating, dyspareunia, dysuria, menstrual problem, pelvic pain, urgency, vaginal bleeding, vaginal discharge and vaginal pain  Neurological: Negative for headaches  Physical Exam:  /68   Ht 5' 6" (1 676 m)   Wt 71 2 kg (157 lb)   LMP 10/03/2022   BMI 25 34 kg/m²    Physical Exam  Constitutional:       General: She is awake  Appearance: Normal appearance  She is well-developed  Genitourinary:      Vulva, bladder and urethral meatus normal       Right Labia: No rash, tenderness or lesions  Left Labia: No tenderness, lesions or rash  No labial fusion noted  No vaginal discharge, erythema, tenderness or bleeding  No vaginal prolapse present  No vaginal atrophy present  Vaginal exam comments: Blood in vault from menses    Right Adnexa: not tender, not full and no mass present  Left Adnexa: not tender, not full and no mass present  Cervix is nulliparous  No cervical motion tenderness, discharge, lesion or polyp  Uterus is not enlarged, fixed, tender or irregular  No uterine mass detected  No urethral prolapse present  Bladder is not tender  Pelvic exam was performed with patient in the lithotomy position  Breasts:      Right: No inverted nipple, mass, nipple discharge, skin change, tenderness, axillary adenopathy or supraclavicular adenopathy  Left: No inverted nipple, mass, nipple discharge, skin change, tenderness, axillary adenopathy or supraclavicular adenopathy  HENT:      Head: Normocephalic and atraumatic  Cardiovascular:      Rate and Rhythm: Normal rate and regular rhythm  Heart sounds: Normal heart sounds     Pulmonary:      Effort: Pulmonary effort is normal  No tachypnea or respiratory distress  Breath sounds: Normal breath sounds  Abdominal:      General: Abdomen is flat  There is no distension  Palpations: Abdomen is soft  Tenderness: There is no abdominal tenderness  There is no guarding or rebound  Musculoskeletal:      Cervical back: Neck supple  Lymphadenopathy:      Upper Body:      Right upper body: No supraclavicular or axillary adenopathy  Left upper body: No supraclavicular or axillary adenopathy  Neurological:      General: No focal deficit present  Mental Status: She is alert and oriented to person, place, and time  Psychiatric:         Mood and Affect: Mood normal          Behavior: Behavior normal          Thought Content: Thought content normal          Judgment: Judgment normal    Vitals reviewed

## 2022-12-23 ENCOUNTER — OFFICE VISIT (OUTPATIENT)
Dept: URGENT CARE | Facility: CLINIC | Age: 27
End: 2022-12-23

## 2022-12-23 ENCOUNTER — APPOINTMENT (OUTPATIENT)
Dept: RADIOLOGY | Facility: CLINIC | Age: 27
End: 2022-12-23

## 2022-12-23 VITALS — RESPIRATION RATE: 16 BRPM | TEMPERATURE: 99.6 F | OXYGEN SATURATION: 98 % | HEART RATE: 105 BPM

## 2022-12-23 DIAGNOSIS — J39.8 CONGESTION OF UPPER RESPIRATORY TRACT: ICD-10-CM

## 2022-12-23 DIAGNOSIS — J39.8 CONGESTION OF UPPER RESPIRATORY TRACT: Primary | ICD-10-CM

## 2022-12-23 RX ORDER — ALBUTEROL SULFATE 90 UG/1
2 AEROSOL, METERED RESPIRATORY (INHALATION) EVERY 6 HOURS PRN
Qty: 6.7 G | Refills: 0 | Status: SHIPPED | OUTPATIENT
Start: 2022-12-23

## 2022-12-23 NOTE — PATIENT INSTRUCTIONS
Acute Upper Respiratory Tract Infection:   -Xray shows no acute cardiopulmonary abnormalities  Awaiting official read  -Wells score is 1 5 and is very low risk, clinically well appearing and stable  -Will send in albuterol inhaler for her chest tightness   -Based on the patients hx and presentation they are likely suffering from a Viral illness  No sign of bacterial infection at this time    -Stay very well hydrated and push fluids  -Run a humidifier by your bed and take steam showers to clear mucus   -Zicam nasal AllClear can be soothing to the nasal passages   -You can use flonase once daily for two weeks   -Advil or Tylenol for fever or pain  -Mucinex OTC or Zyrtec or Claritin  Drink plenty of water with the mucinex    -Honey or throat lozenges for cough may be helpful  -Warm salt water gargles and tea with honey   -Obtain a pulse ox device and check your O2 1-2 times a day  You want your oxygen levels to be >93%  If they go below 93% go to the ED immediately  -Vitamin C and D daily   You can attempt to use zinc or Zicam    -If your sx worsen or persist follow up with your PCP for recheck

## 2022-12-23 NOTE — PROGRESS NOTES
[facility]        NAME: Kandice Hall is a 32 y o  female  : 1995    MRN: [id number]  DATE: 2022  TIME: 9:09 AM    Assessment and Plan   Congestion of upper respiratory tract [J39 8]  1  Congestion of upper respiratory tract  XR chest pa & lateral            Patient Instructions   Acute Upper Respiratory Tract Infection:   -Xray shows no acute cardiopulmonary abnormalities  Awaiting official read  -Wells score is 1 5 and is very low risk, clinically well appearing and stable  -Will send in albuterol inhaler for her chest tightness   -Based on the patients hx and presentation they are likely suffering from a Viral illness  No sign of bacterial infection at this time    -Stay very well hydrated and push fluids  -Run a humidifier by your bed and take steam showers to clear mucus   -Zicam nasal AllClear can be soothing to the nasal passages   -You can use flonase once daily for two weeks   -Advil or Tylenol for fever or pain  -Mucinex OTC or Zyrtec or Claritin  Drink plenty of water with the mucinex    -Honey or throat lozenges for cough may be helpful  -Warm salt water gargles and tea with honey   -Obtain a pulse ox device and check your O2 1-2 times a day  You want your oxygen levels to be >93%  If they go below 93% go to the ED immediately  -Vitamin C and D daily  You can attempt to use zinc or Zicam    -If your sx worsen or persist follow up with your PCP for recheck      Follow up with PCP in 3-5 days  Proceed to  ER if symptoms worsen  Chief Complaint     Chief Complaint   Patient presents with   • Cold Like Symptoms     Pt presents with cough, started two days ago; worsening cough         History of Present Illness       Jean Marie Hernandez is a 80-year-old female who presents today for a two day hx of cough  SHe states that the cough has been worsening  She states that this morning she began to experience chest tightness  She states that the cough is dry   She also notes rhinorrhea and congestion  No fever, chills, body aches  No dyspnea, wheezing, cp, palpitations  No dizziness or weakness  No GI sx  Good PO intake  No loss of taste or smell  No lower extremity edema, redness or tenderness  No hx of asthma or smoking  No known sick contacts or recent travel  No OTC measures  Review of Systems   Review of Systems   Constitutional: Negative for activity change, appetite change, chills, diaphoresis, fatigue and fever  HENT: Positive for congestion and rhinorrhea  Negative for ear discharge, ear pain, facial swelling, hearing loss, postnasal drip, sinus pressure, sinus pain, sore throat, tinnitus, trouble swallowing and voice change  Eyes: Negative for visual disturbance  Respiratory: Positive for cough and chest tightness  Negative for apnea, shortness of breath, wheezing and stridor  Cardiovascular: Negative for chest pain, palpitations and leg swelling  Gastrointestinal: Negative for abdominal distention and abdominal pain  Genitourinary: Negative for decreased urine volume  Musculoskeletal: Negative for arthralgias, joint swelling, myalgias, neck pain and neck stiffness  Skin: Negative for rash  Allergic/Immunologic: Negative for immunocompromised state  Neurological: Negative for dizziness, weakness, light-headedness, numbness and headaches  Hematological: Negative for adenopathy           Current Medications       Current Outpatient Medications:   •  famotidine (PEPCID) 40 MG tablet, , Disp: , Rfl:   •  levonorgestrel-ethinyl estradiol (Larissia) 0 1-20 MG-MCG per tablet, Take 1 tablet by mouth daily, Disp: 84 tablet, Rfl: 3  •  omeprazole (PriLOSEC) 40 MG capsule, , Disp: , Rfl:     Current Allergies     Allergies as of 12/23/2022   • (No Known Allergies)            The following portions of the patient's history were reviewed and updated as appropriate: allergies, current medications, past family history, past medical history, past social history, past surgical history and problem list      Past Medical History:   Diagnosis Date   • Acid reflux    • Asthma    • GERD (gastroesophageal reflux disease)        Past Surgical History:   Procedure Laterality Date   • HYSTEROSCOPY     • VAGINAL SEPTUM RESECTION  06/04/2018   • WISDOM TOOTH EXTRACTION         Family History   Problem Relation Age of Onset   • No Known Problems Mother    • Hypertension Father    • Diabetes Paternal Grandmother    • Diabetes Paternal Grandfather    • Ovarian cancer Maternal Aunt          Medications have been verified  Objective   Pulse (!) 118   Temp 99 6 °F (37 6 °C)   Resp 16   LMP 12/01/2022 (Approximate)   SpO2 98%   Patient's last menstrual period was 12/01/2022 (approximate)  Physical Exam     Physical Exam  Vitals and nursing note reviewed  Constitutional:       General: She is not in acute distress  Appearance: She is well-developed  She is not diaphoretic  HENT:      Head: Normocephalic and atraumatic  Right Ear: Hearing, tympanic membrane, ear canal and external ear normal       Left Ear: Hearing, tympanic membrane, ear canal and external ear normal       Nose: Nose normal  No mucosal edema or rhinorrhea  Right Sinus: No maxillary sinus tenderness or frontal sinus tenderness  Left Sinus: No maxillary sinus tenderness or frontal sinus tenderness  Mouth/Throat:      Pharynx: Uvula midline  No oropharyngeal exudate, posterior oropharyngeal erythema or uvula swelling  Tonsils: No tonsillar abscesses  Cardiovascular:      Rate and Rhythm: Regular rhythm  Tachycardia present  No extrasystoles are present  Pulses: Normal pulses  Heart sounds: S1 normal and S2 normal  Heart sounds not distant  No murmur heard  No friction rub  No gallop  No S3 or S4 sounds  Pulmonary:      Effort: No tachypnea, bradypnea, accessory muscle usage or respiratory distress        Breath sounds: No decreased breath sounds, wheezing, rhonchi or rales  Musculoskeletal:      Cervical back: Normal range of motion and neck supple  Right lower leg: No edema  Left lower leg: No edema  Lymphadenopathy:      Cervical: No cervical adenopathy  Neurological:      Mental Status: She is alert and oriented to person, place, and time     Psychiatric:         Behavior: Behavior normal

## 2022-12-24 LAB
FLUAV RNA RESP QL NAA+PROBE: POSITIVE
FLUBV RNA RESP QL NAA+PROBE: NEGATIVE
SARS-COV-2 RNA RESP QL NAA+PROBE: NEGATIVE

## 2023-01-03 ENCOUNTER — OFFICE VISIT (OUTPATIENT)
Dept: OBGYN CLINIC | Facility: CLINIC | Age: 28
End: 2023-01-03

## 2023-01-03 VITALS
DIASTOLIC BLOOD PRESSURE: 62 MMHG | WEIGHT: 157 LBS | HEIGHT: 66 IN | BODY MASS INDEX: 25.23 KG/M2 | SYSTOLIC BLOOD PRESSURE: 110 MMHG

## 2023-01-03 DIAGNOSIS — N76.0 RECURRENT VAGINITIS: Primary | ICD-10-CM

## 2023-01-03 NOTE — PATIENT INSTRUCTIONS
Guidelines for Vulvar Skin Care    NOTE:     The goal is to promote healthy vulvar skin  This is done by decreasing and/or removing any chemicals, moisture, or rubbing (friction)  Any products listed below have been suggested for use because of their past success in helping to decrease or relieve vulvar/vaginal itching and burning  LAUNDRY PRODUCTS  Use a detergent free of dyes, enzymes and perfumes (such as ALL-Free and Clear or Earth-Rite) on any clothing that comes in contact with your vulva such as your underwear, exercise clothes, towels, or pajama bottoms  Use 1/3 to 1/2 the suggested amount per load  Other clothing may be washed in the laundry soap of your choice  Do not use a fabric softener in the washer or dryer on these articles of clothing  If you do use dryer sheets with the rest of your clothes, for any loads, you must hang dry your underwear, towels, and any other clothing that comes in contact with your vulva  Stain Removing Products  Soak and rinse in clear water all underwear and towels on which you have used a stain removing product  Then wash in your regular washing cycle  This removes as much of the product as possible  CLOTHING  Wear white all cotton underwear, not nylon with a cotton crotch  Cotton allows air in and moisture out  Avoid pantyhose  If you must wear them, either cut out the lexx crotch (if you cut out the crotch be sure to leave about 1/4 to 1/2 inch of fabric from the seam to prevent running) or wear thigh high hose  Many stores now carry thigh high nylons  Avoid tight clothing, especially clothing made of synthetic fabrics  Remove wet bathing and exercise clothing as soon as you can  BATHING AND HYGIENE  Avoid bath soaps, lotions, gels, etc  which contain perfumes  These may smell nice but can be irritating  This includes many baby products and feminine hygiene products marked "gentle" or "mild"   Dove-Hypoallergenic, Neutrogena, Basis, or Pears are the soaps we suggest  Do not use soap directly on the vulvar skin; just warm water and your hand will keep the vulvar area clean without irritating the skin  Avoid all bubble baths, bath salts and scented oils  You may apply a neutral (unscented, non-perfumed) oil such as Michell Oil to damp skin after getting out of the tub or shower  Do not apply oils directly to the vulva  Do not scrub vulvar skin with a washcloth, washing with your hand and warm water is enough for good cleaning  Pat dry rather than rubbing with a towel  Or use a hairdryer on a cool setting to dry the vulva  Baking Soda soaks  Soak in lukewarm (not hot) bath water with 4-5 tablespoons of baking soda to help soothe vulvar itching and burning  Soak 1 to 3 times a day for 10-15 minutes  Cool Compresses: Apply cool compresses to the vulva for 15-20 minutes at a time, up to three times per day for burning or itching  Do not use for prolonged amounts of time as this can lead to damage to the skin  Use white, unscented toilet paper  If paper has a perfumed scent or lotion, avoid using it  Avoid all feminine hygiene sprays, perfumes, adult, or baby wipes  Pour lukewarm water over the vulva after urinating if urine causes burning of the skin  Pat dry rather than rubbing with a towel  Avoid the use of deodorized pads and tampons  Tampons should be used when the blood flow is heavy enough to soak one tampon in four hours or less  Tampons are safe for most women, but wearing them too long or when the blood flow is light may result in vaginal infection, increased discharge, odor, or toxic shock syndrome  Also, use only pads that have a cotton liner that comes in contact with your skin  You can obtain all cotton pads from [employer]  Avoid all over the counter creams or ointments, except A&D Ointment  Ask your health care provider first   Small amounts of A&D Ointment may be applied to your vulva as often as needed to protect the skin   It may also help to decrease skin irritation during your period and when you urinate  Brands that have been helpful are the Just[place] brand, [employer]  brand, Rugby brand, or [employer] [facility] brand  DO NOT DOUCHE  Baking soda soaks will help rinse away extra discharge and help with odor  DO NOT SHAVE the vulvar area  Some women may have problems with chronic dampness  Keeping dry is important  Choose cotton fabrics whenever you can  Keep an extra pair of underwear with you in a small bag and change if you become damp during the day at work/school  Gold Bond Powder or Zeosorb Powder may be applied to the vulva and groin area one to two times per day to help absorb moisture  Dryness and irritation of the vagina and vulva may be helped by using an emoillent  Use a small amount of a pure vegetable oil such as Crisco or olive oil  The vegetable oils contain no chemicals to irritate vulvar/vaginal skin  Vegetable oils will rinse away with water and will not increase your chances of infection  You can also use Vitamin E oil or coconut oil  You can apply the emoillent as often as needed for dryness and irritation; I recommend at least once daily, but you can apply more frequently  You can also use the emollient during intercourse  Water-based products like K-Y Jelly are helpful, but may tend to dry before intercourse is over and also contain chemicals that can irritate your vulvar skin  It may be helpful to use a non-lubricated, non-spermicidal condom, and use vegetable oil as the lubricant  This will help keep the semen off the skin which can decrease burning and irritation after intercourse

## 2023-01-03 NOTE — PROGRESS NOTES
Assessment/Plan    Diagnoses and all orders for this visit:    Recurrent vaginitis      Patient to call if symptoms return  Will treat with diflucan 150mg every 3 days for 3 doses and nystatin-triamcinolone ointment  Coconut oil to vulva PRN irritation  RTO for annual exam in October, or PRN  Subjective  Chief Complaint   Patient presents with   • Vaginitis     Pt is here for re-occurring yeast infections  She has had at least three in the last few months  She is not symptomatic today  Wilbur Berry is a 32 y o  [place] female here for a problem visit  She has a new exclusive partner for 4-5 months  She was treated at urgent care as she lives in [place]  Before , she was treated with diflucan and flagyl, she needed a second dose of diflucan a week later  She self treated with 3-day monistat  They are not using condoms  She uses the bathroom after intercourse  Her partner does have a beard that has caused chaffing and dryness  Patient Active Problem List   Diagnosis   • Uterus didelphys         Gynecologic History  Patient's last menstrual period was 2022  Contraception: OCP (estrogen/progesterone)  Last Pap:  NILM, neg HPV       Obstetric History  OB History    Para Term  AB Living   0 0 0 0 0 0   SAB IAB Ectopic Multiple Live Births   0 0 0 0 0         Past Medical History:   Diagnosis Date   • Acid reflux    • Asthma    • GERD (gastroesophageal reflux disease)        Past Surgical History:   Procedure Laterality Date   • HYSTEROSCOPY     • VAGINAL SEPTUM RESECTION  2018   • WISDOM TOOTH EXTRACTION           Family History   Problem Relation Age of Onset   • No Known Problems Mother    • Hypertension Father    • Diabetes Paternal Grandmother    • Diabetes Paternal Grandfather    • Ovarian cancer Maternal Aunt        Social History     Socioeconomic History   • Marital status: Single     Spouse name: Not on file   • Number of children: Not on file   • Years of education: Not on file   • Highest education level: Not on file   Occupational History   • Not on file   Tobacco Use   • Smoking status: Never     Passive exposure: Never   • Smokeless tobacco: Never   Vaping Use   • Vaping Use: Never used   Substance and Sexual Activity   • Alcohol use: Yes     Alcohol/week: 3 0 - 5 0 standard drinks     Types: 3 - 5 Standard drinks or equivalent per week     Comment: occasional   • Drug use: No   • Sexual activity: Yes     Partners: Male     Birth control/protection: OCP   Other Topics Concern   • Not on file   Social History Narrative   • Not on file     Social Determinants of Health     Financial Resource Strain: Not on file   Food Insecurity: Not on file   Transportation Needs: Not on file   Physical Activity: Not on file   Stress: Not on file   Social Connections: Not on file   Intimate Partner Violence: Not on file   Housing Stability: Not on file       Allergies  Patient has no known allergies  Medications    Current Outpatient Medications:   •  albuterol (Proventil HFA) 90 mcg/act inhaler, Inhale 2 puffs every 6 (six) hours as needed for wheezing or shortness of breath, Disp: 6 7 g, Rfl: 0  •  famotidine (PEPCID) 40 MG tablet, , Disp: , Rfl:   •  levonorgestrel-ethinyl estradiol (Larissia) 0 1-20 MG-MCG per tablet, Take 1 tablet by mouth daily, Disp: 84 tablet, Rfl: 3  •  omeprazole (PriLOSEC) 40 MG capsule, , Disp: , Rfl:       Review of Systems  Review of Systems   Constitutional: Negative for chills and fever  Genitourinary: Negative for menstrual problem, vaginal discharge and vaginal pain  Objective     /62   Ht 5' 6" (1 676 m)   Wt 71 2 kg (157 lb)   LMP 12/26/2022   BMI 25 34 kg/m²       Physical Exam  Constitutional:       General: She is not in acute distress  Appearance: Normal appearance  She is well-developed  She is not ill-appearing  Pulmonary:      Effort: Pulmonary effort is normal  No respiratory distress  Neurological:      General: No focal deficit present  Mental Status: She is alert and oriented to person, place, and time  Psychiatric:         Mood and Affect: Mood normal          Behavior: Behavior normal          Thought Content: Thought content normal          Judgment: Judgment normal    Vitals and nursing note reviewed

## 2023-03-06 DIAGNOSIS — B37.31 VULVOVAGINAL CANDIDIASIS: Primary | ICD-10-CM

## 2023-03-06 RX ORDER — FLUCONAZOLE 150 MG/1
150 TABLET ORAL
Qty: 2 TABLET | Refills: 0 | Status: SHIPPED | OUTPATIENT
Start: 2023-03-06 | End: 2023-03-10

## 2023-03-10 DIAGNOSIS — B37.31 VULVOVAGINAL CANDIDIASIS: ICD-10-CM

## 2023-03-10 RX ORDER — FLUCONAZOLE 150 MG/1
150 TABLET ORAL ONCE
Qty: 1 TABLET | Refills: 0 | Status: SHIPPED | OUTPATIENT
Start: 2023-03-10 | End: 2023-03-10

## 2023-03-12 ENCOUNTER — OFFICE VISIT (OUTPATIENT)
Dept: URGENT CARE | Facility: CLINIC | Age: 28
End: 2023-03-12

## 2023-03-12 VITALS
TEMPERATURE: 98.2 F | HEART RATE: 81 BPM | WEIGHT: 160 LBS | OXYGEN SATURATION: 99 % | RESPIRATION RATE: 14 BRPM | BODY MASS INDEX: 25.82 KG/M2

## 2023-03-12 DIAGNOSIS — S01.331A COMPLICATION OF RIGHT EAR PIERCING, INITIAL ENCOUNTER: Primary | ICD-10-CM

## 2023-03-12 RX ORDER — CEPHALEXIN 500 MG/1
500 CAPSULE ORAL EVERY 8 HOURS SCHEDULED
Qty: 21 CAPSULE | Refills: 0 | Status: SHIPPED | OUTPATIENT
Start: 2023-03-12 | End: 2023-03-19

## 2023-03-12 NOTE — PATIENT INSTRUCTIONS
R infected ear piercing: ddx cellulitis of the R ear:   -The area was cleansed with warm water and skin cleanser today  -Will send in Keflex to be taken as prescribed  Take with food and a probiotic    -Bactroban ointment topically 2 times per day after cleansing with warm soapy water  -Warm compress 2-3 times a day   -Turn the earring to avoid crusting  -Follow up for removal of the earring if worsening sx  Follow up as needed

## 2023-03-12 NOTE — PROGRESS NOTES
[email or web address]        NAME: Francisca Dowd is a 32 y o  female  : 1995    MRN: [id number]  DATE: 2023  TIME: 8:58 AM    Assessment and Plan   Complication of right ear piercing, initial encounter [S01 331A]  1  Complication of right ear piercing, initial encounter  mupirocin (BACTROBAN) 2 % ointment    cephalexin (KEFLEX) 500 mg capsule          Universal Protocol:  Risks and benefits: risks, benefits and alternatives were discussed  Consent given by: patient    Foreign body removal    Date/Time: 3/12/2023 4:34 PM  Performed by: Jaja Dhillon PA-C  Authorized by: Jaja Dhillon PA-C   Body area: ear  Location details: right ear    Anesthesia:  Local Anesthetic: lidocaine 1% without epinephrine and LET (lido,epi,tetracaine)  Localization method: visualized  Removal mechanism: forceps  Complexity: simple  1 objects recovered  Objects recovered: earring  Post-procedure assessment: foreign body removed  Patient tolerance: patient tolerated the procedure well with no immediate complications      Patient Instructions   R infected ear piercing: ddx cellulitis of the R ear:   -The area was cleansed with warm water and skin cleanser today  LET gel was applied and lidocaine 1% was used  and the earring was removed without complication    -Will send in Keflex to be taken as prescribed  Take with food and a probiotic    -Bactroban ointment topically 2 times per day after cleansing with warm soapy water  -Warm compress 2-3 times a day   -Turn the earring to avoid crusting  -Follow up for removal of the earring if worsening sx  Follow up as needed  Follow up with PCP in 3-5 days  Proceed to  ER if symptoms worsen      Chief Complaint     Chief Complaint   Patient presents with   • Wound Check     Pt presents with right ear piercing that is inflamed and red; tender to touch; new piercing two weeks          History of Present Illness       The patient presents today for a possible infected ear piercing on the R side  She states that two weeks ago she got the ear piercing and she now has redness, swelling and tenderness over the last 24 hours  She states that she has purulent drainage  She has been cleaning it with a saline solution  No fever, chills, body aches  Review of Systems   Review of Systems   Constitutional: Negative for activity change, appetite change, chills, diaphoresis, fatigue and fever  HENT: Negative for congestion, ear discharge, ear pain, facial swelling, hearing loss, postnasal drip, rhinorrhea, sinus pressure, sinus pain, sore throat, tinnitus, trouble swallowing and voice change  Eyes: Negative for visual disturbance  Respiratory: Negative for apnea, cough, chest tightness, shortness of breath, wheezing and stridor  Cardiovascular: Negative for chest pain, palpitations and leg swelling  Gastrointestinal: Negative for abdominal distention and abdominal pain  Genitourinary: Negative for decreased urine volume  Musculoskeletal: Negative for arthralgias, joint swelling, myalgias, neck pain and neck stiffness  Skin: Negative for rash  Allergic/Immunologic: Negative for immunocompromised state  Neurological: Negative for dizziness, weakness, light-headedness, numbness and headaches  Hematological: Negative for adenopathy           Current Medications       Current Outpatient Medications:   •  albuterol (Proventil HFA) 90 mcg/act inhaler, Inhale 2 puffs every 6 (six) hours as needed for wheezing or shortness of breath, Disp: 6 7 g, Rfl: 0  •  cephalexin (KEFLEX) 500 mg capsule, Take 1 capsule (500 mg total) by mouth every 8 (eight) hours for 7 days, Disp: 21 capsule, Rfl: 0  •  famotidine (PEPCID) 40 MG tablet, , Disp: , Rfl:   •  levonorgestrel-ethinyl estradiol (Larissia) 0 1-20 MG-MCG per tablet, Take 1 tablet by mouth daily, Disp: 84 tablet, Rfl: 3  •  mupirocin (BACTROBAN) 2 % ointment, Apply topically 3 (three) times a day, Disp: 22 g, Rfl: 0  • omeprazole (PriLOSEC) 40 MG capsule, , Disp: , Rfl:     Current Allergies     Allergies as of 03/12/2023   • (No Known Allergies)            The following portions of the patient's history were reviewed and updated as appropriate: allergies, current medications, past family history, past medical history, past social history, past surgical history and problem list      Past Medical History:   Diagnosis Date   • Acid reflux    • Asthma    • GERD (gastroesophageal reflux disease)        Past Surgical History:   Procedure Laterality Date   • HYSTEROSCOPY     • VAGINAL SEPTUM RESECTION  06/04/2018   • WISDOM TOOTH EXTRACTION         Family History   Problem Relation Age of Onset   • No Known Problems Mother    • Hypertension Father    • Diabetes Paternal Grandmother    • Diabetes Paternal Grandfather    • Ovarian cancer Maternal Aunt          Medications have been verified  Objective   Pulse 81   Temp 98 2 °F (36 8 °C)   Resp 14   Wt 72 6 kg (160 lb)   LMP 02/26/2023 (Approximate)   SpO2 99%   BMI 25 82 kg/m²   Patient's last menstrual period was 02/26/2023 (approximate)  Physical Exam     Physical Exam  Vitals and nursing note reviewed  Constitutional:       General: She is not in acute distress  Appearance: Normal appearance  She is not ill-appearing or toxic-appearing  HENT:      Head: Normocephalic and atraumatic  Right Ear: Hearing, tympanic membrane and ear canal normal       Left Ear: Hearing, tympanic membrane, ear canal and external ear normal       Ears:     Cardiovascular:      Rate and Rhythm: Normal rate and regular rhythm  Pulmonary:      Effort: Pulmonary effort is normal       Breath sounds: Normal breath sounds and air entry  Neurological:      Mental Status: She is alert

## 2023-05-05 ENCOUNTER — OFFICE VISIT (OUTPATIENT)
Dept: OBGYN CLINIC | Facility: CLINIC | Age: 28
End: 2023-05-05

## 2023-05-05 VITALS
HEIGHT: 66 IN | WEIGHT: 162 LBS | BODY MASS INDEX: 26.03 KG/M2 | DIASTOLIC BLOOD PRESSURE: 78 MMHG | SYSTOLIC BLOOD PRESSURE: 114 MMHG

## 2023-05-05 DIAGNOSIS — B37.31 VULVOVAGINAL CANDIDIASIS: Primary | ICD-10-CM

## 2023-05-05 DIAGNOSIS — R30.0 DYSURIA: ICD-10-CM

## 2023-05-05 NOTE — PROGRESS NOTES
"  Assessment/Plan      vulvo vaginaitis candidiasis  1  Wet prep was not obtained  2  sureswab sent  3  Patient was treated with ibrexafungerp  4  Symptomatic local care discussed  5  Vulvar care education handout given  Subjective     Chief Complaint   Patient presents with    Vaginitis     Pt is here for recurrent yeast  Pt states this has been occurring since November  Pt has abnormal discharge and itching  Pt doing well no concerns  Darrius Givens is a 32 y o  female who presents for evaluation of an abnormal vaginal discharge  Symptoms have been present for 6 months on and off  Vaginal symptoms: discharge described as white and curd-like, pain and vulvar itching  Contraception: OCP (estrogen/progesterone)  She denies abnormal bleeding and odor Sexually transmitted infection risk: very low risk of STD exposure  Menstrual flow: regular every 28-30 days  ROS:   Review of Systems   Genitourinary: Positive for vaginal discharge and vaginal pain  Negative for menstrual problem, pelvic pain and vaginal bleeding  Patient Active Problem List   Diagnosis    Uterus didelphys    Complication of right ear piercing       The following portions of the patient's history were reviewed and updated as appropriate: allergies, current medications, past family history, past medical history, past social history, past surgical history and problem list     /78 (BP Location: Right arm, Patient Position: Sitting, Cuff Size: Standard)   Ht 5' 6\" (1 676 m)   Wt 73 5 kg (162 lb)   LMP 04/17/2023 (Approximate)   BMI 26 15 kg/m²     Physical Exam  Constitutional:       General: She is not in acute distress  Appearance: Normal appearance  She is well-developed  She is not ill-appearing  Genitourinary:      Vulva normal       Right Labia: No rash, tenderness or lesions  Left Labia: No tenderness, lesions or rash  Vaginal discharge (thick chunks of yellow discharge) present        " No vaginal bleeding  No vaginal prolapse present  No vaginal atrophy present  No cervical motion tenderness  Pulmonary:      Effort: Pulmonary effort is normal  No respiratory distress  Neurological:      General: No focal deficit present  Mental Status: She is alert and oriented to person, place, and time  Psychiatric:         Mood and Affect: Mood normal          Behavior: Behavior normal          Thought Content: Thought content normal          Judgment: Judgment normal    Vitals and nursing note reviewed

## 2023-05-05 NOTE — PATIENT INSTRUCTIONS
"Guidelines for Vulvar Skin Care    NOTE:     The goal is to promote healthy vulvar skin  This is done by decreasing and/or removing any chemicals, moisture, or rubbing (friction)  Any products listed below have been suggested for use because of their past success in helping to decrease or relieve vulvar/vaginal itching and burning  LAUNDRY PRODUCTS  Use a detergent free of dyes, enzymes and perfumes (such as ALL-Free and Clear or Earth-Rite) on any clothing that comes in contact with your vulva such as your underwear, exercise clothes, towels, or pajama bottoms  Use 1/3 to 1/2 the suggested amount per load  Other clothing may be washed in the laundry soap of your choice  Do not use a fabric softener in the washer or dryer on these articles of clothing  If you do use dryer sheets with the rest of your clothes, for any loads, you must hang dry your underwear, towels, and any other clothing that comes in contact with your vulva  Stain Removing Products  Soak and rinse in clear water all underwear and towels on which you have used a stain removing product  Then wash in your regular washing cycle  This removes as much of the product as possible  CLOTHING  Wear white all cotton underwear, not nylon with a cotton crotch  Cotton allows air in and moisture out  Avoid pantyhose  If you must wear them, either cut out the lexx crotch (if you cut out the crotch be sure to leave about 1/4 to 1/2 inch of fabric from the seam to prevent running) or wear thigh high hose  Many stores now carry thigh high nylons  Avoid tight clothing, especially clothing made of synthetic fabrics  Remove wet bathing and exercise clothing as soon as you can  BATHING AND HYGIENE  Avoid bath soaps, lotions, gels, etc  which contain perfumes  These may smell nice but can be irritating  This includes many baby products and feminine hygiene products marked \"gentle\" or \"mild\"   Dove-Hypoallergenic, Neutrogena, Basis, or Pears are the soaps " we suggest  Do not use soap directly on the vulvar skin; just warm water and your hand will keep the vulvar area clean without irritating the skin  Avoid all bubble baths, bath salts and scented oils  You may apply a neutral (unscented, non-perfumed) oil such as Michell Oil to damp skin after getting out of the tub or shower  Do not apply oils directly to the vulva  Do not scrub vulvar skin with a washcloth, washing with your hand and warm water is enough for good cleaning  Pat dry rather than rubbing with a towel  Or use a hairdryer on a cool setting to dry the vulva  Baking Soda soaks  Soak in lukewarm (not hot) bath water with 4-5 tablespoons of baking soda to help soothe vulvar itching and burning  Soak 1 to 3 times a day for 10-15 minutes  Cool Compresses: Apply cool compresses to the vulva for 15-20 minutes at a time, up to three times per day for burning or itching  Do not use for prolonged amounts of time as this can lead to damage to the skin  Use white, unscented toilet paper  If paper has a perfumed scent or lotion, avoid using it  Avoid all feminine hygiene sprays, perfumes, adult, or baby wipes  Pour lukewarm water over the vulva after urinating if urine causes burning of the skin  Pat dry rather than rubbing with a towel  Avoid the use of deodorized pads and tampons  Tampons should be used when the blood flow is heavy enough to soak one tampon in four hours or less  Tampons are safe for most women, but wearing them too long or when the blood flow is light may result in vaginal infection, increased discharge, odor, or toxic shock syndrome  Also, use only pads that have a cotton liner that comes in contact with your skin  You can obtain all cotton pads from [employer]  Avoid all over the counter creams or ointments, except A&D Ointment  Ask your health care provider first   Small amounts of A&D Ointment may be applied to your vulva as often as needed to protect the skin   It may also help to decrease skin irritation during your period and when you urinate  Brands that have been helpful are the [employer] [place] brand, [employer]  brand, [employer] brand, or [employer] [facility] brand  DO NOT DOUCHE  Baking soda soaks will help rinse away extra discharge and help with odor  DO NOT SHAVE the vulvar area  Some women may have problems with chronic dampness  Keeping dry is important  Choose cotton fabrics whenever you can  Keep an extra pair of underwear with you in a small bag and change if you become damp during the day at work/school  Gold Bond Powder or Zeosorb Powder may be applied to the vulva and groin area one to two times per day to help absorb moisture  Dryness and irritation of the vagina and vulva may be helped by using an emoillent  Use a small amount of a pure vegetable oil such as Crisco or olive oil  The vegetable oils contain no chemicals to irritate vulvar/vaginal skin  Vegetable oils will rinse away with water and will not increase your chances of infection  You can also use Vitamin E oil or coconut oil  You can apply the emoillent as often as needed for dryness and irritation; I recommend at least once daily, but you can apply more frequently  You can also use the emollient during intercourse  Water-based products like K-Y Jelly are helpful, but may tend to dry before intercourse is over and also contain chemicals that can irritate your vulvar skin  It may be helpful to use a non-lubricated, non-spermicidal condom, and use vegetable oil as the lubricant  This will help keep the semen off the skin which can decrease burning and irritation after intercourse

## 2023-05-06 LAB
BV BACTERIA RRNA VAG QL NAA+PROBE: POSITIVE
C GLABRATA RNA VAG QL NAA+PROBE: NOT DETECTED
C TRACH RRNA SPEC QL NAA+PROBE: NOT DETECTED
CANDIDA RRNA VAG QL PROBE: DETECTED
N GONORRHOEA RRNA SPEC QL NAA+PROBE: NOT DETECTED
T VAGINALIS RRNA SPEC QL NAA+PROBE: NOT DETECTED

## 2023-05-08 DIAGNOSIS — B96.89 BACTERIAL VAGINOSIS: Primary | ICD-10-CM

## 2023-05-08 DIAGNOSIS — N76.0 BACTERIAL VAGINOSIS: Primary | ICD-10-CM

## 2023-05-08 RX ORDER — METRONIDAZOLE 500 MG/1
500 TABLET ORAL 2 TIMES DAILY
Qty: 14 TABLET | Refills: 0 | Status: SHIPPED | OUTPATIENT
Start: 2023-05-08 | End: 2023-05-15

## 2023-10-06 DIAGNOSIS — Z30.41 ENCOUNTER FOR SURVEILLANCE OF CONTRACEPTIVE PILLS: ICD-10-CM

## 2023-10-06 RX ORDER — LEVONORGESTREL AND ETHINYL ESTRADIOL 0.1-0.02MG
1 KIT ORAL DAILY
Qty: 84 TABLET | Refills: 3 | Status: SHIPPED | OUTPATIENT
Start: 2023-10-06

## 2023-10-06 NOTE — TELEPHONE ENCOUNTER
Patient is requesting a BC refill be sent to the [facility] pharmacy in [place] on [address]. She was last seen on 5/5/23 and has her yearly scheduled on 2/2/24. Thank you.

## 2024-02-02 ENCOUNTER — ANNUAL EXAM (OUTPATIENT)
Dept: OBGYN CLINIC | Facility: CLINIC | Age: 29
End: 2024-02-02
Payer: COMMERCIAL

## 2024-02-02 VITALS
SYSTOLIC BLOOD PRESSURE: 120 MMHG | WEIGHT: 162 LBS | DIASTOLIC BLOOD PRESSURE: 78 MMHG | HEIGHT: 66 IN | BODY MASS INDEX: 26.03 KG/M2

## 2024-02-02 DIAGNOSIS — Z11.3 SCREENING FOR STD (SEXUALLY TRANSMITTED DISEASE): ICD-10-CM

## 2024-02-02 DIAGNOSIS — Z30.41 ENCOUNTER FOR SURVEILLANCE OF CONTRACEPTIVE PILLS: ICD-10-CM

## 2024-02-02 DIAGNOSIS — Z01.419 WELL WOMAN EXAM WITH ROUTINE GYNECOLOGICAL EXAM: Primary | ICD-10-CM

## 2024-02-02 DIAGNOSIS — Z12.4 SCREENING FOR MALIGNANT NEOPLASM OF CERVIX: ICD-10-CM

## 2024-02-02 PROCEDURE — G0145 SCR C/V CYTO,THINLAYER,RESCR: HCPCS | Performed by: OBSTETRICS & GYNECOLOGY

## 2024-02-02 PROCEDURE — 87491 CHLMYD TRACH DNA AMP PROBE: CPT | Performed by: OBSTETRICS & GYNECOLOGY

## 2024-02-02 PROCEDURE — 87591 N.GONORRHOEAE DNA AMP PROB: CPT | Performed by: OBSTETRICS & GYNECOLOGY

## 2024-02-02 PROCEDURE — 99395 PREV VISIT EST AGE 18-39: CPT | Performed by: OBSTETRICS & GYNECOLOGY

## 2024-02-02 NOTE — PROGRESS NOTES
ASSESSMENT & PLAN:   Diagnoses and all orders for this visit:    Well woman exam with routine gynecological exam  -     Liquid-based pap, screening    Screening for malignant neoplasm of cervix  -     Liquid-based pap, screening    Encounter for surveillance of contraceptive pills    Screening for STD (sexually transmitted disease)  -     Chlamydia/GC amplified DNA by PCR          The following were reviewed in today's visit: ASCCP guidelines, Gardisil vaccination, STD testing breast self exam, STD testing, use and side effects of OCPs, and exercise.    Patient to return to office in yearly for annual exam.     All questions have been answered to her satisfaction.        CC:  Annual Gynecologic Examination  Chief Complaint   Patient presents with    Gynecologic Exam     Pt is here for her yearly exam. Pap due.       HPI: Marcela Arteaga is a 28 y.o.  who presents for annual gynecologic examination.  She has the following concerns: none.       Health Maintenance:    Exercise: frequently  Breast exams/breast awareness: no    Past Medical History:   Diagnosis Date    Acid reflux     Asthma     GERD (gastroesophageal reflux disease)        Past Surgical History:   Procedure Laterality Date    HYSTEROSCOPY      VAGINAL SEPTUM RESECTION  2018    WISDOM TOOTH EXTRACTION         Past OB/Gyn History:  Period Cycle (Days): 28  Period Duration (Days): 5  Period Pattern: Regular  Menstrual Flow: Heavy  Dysmenorrhea: (!) Mild  Dysmenorrhea Symptoms: Cramping, Nausea, HeadachePatient's last menstrual period was 2024 (approximate).    Last Pap:  : no abnormalities  History of abnormal Pap smear: no  HPV vaccine completed: yes    Patient is currently sexually active.   STD testing: no  Current contraception: OCP (estrogen/progesterone)      Family History  Family History   Problem Relation Age of Onset    No Known Problems Mother     Hypertension Father     Diabetes Paternal Grandmother     Diabetes  Paternal Grandfather     Ovarian cancer Maternal Aunt        Family history of uterine or ovarian cancer: yes  Family history of breast cancer: no  Family history of colon cancer: no    Social History:  Social History     Socioeconomic History    Marital status: Single     Spouse name: Not on file    Number of children: Not on file    Years of education: Not on file    Highest education level: Not on file   Occupational History    Not on file   Tobacco Use    Smoking status: Never     Passive exposure: Never    Smokeless tobacco: Never   Vaping Use    Vaping status: Never Used   Substance and Sexual Activity    Alcohol use: Yes     Alcohol/week: 3.0 - 5.0 standard drinks of alcohol     Types: 3 - 5 Standard drinks or equivalent per week     Comment: occasional    Drug use: No    Sexual activity: Yes     Partners: Male     Birth control/protection: OCP   Other Topics Concern    Not on file   Social History Narrative    Not on file     Social Determinants of Health     Financial Resource Strain: Not on file   Food Insecurity: Not on file   Transportation Needs: Not on file   Physical Activity: Not on file   Stress: Not on file   Social Connections: Not on file   Intimate Partner Violence: Not on file   Housing Stability: Not on file     Domestic violence screen: negative    Allergies:  No Known Allergies    Medications:    Current Outpatient Medications:     albuterol (Proventil HFA) 90 mcg/act inhaler, Inhale 2 puffs every 6 (six) hours as needed for wheezing or shortness of breath, Disp: 6.7 g, Rfl: 0    famotidine (PEPCID) 40 MG tablet, , Disp: , Rfl:     Ibrexafungerp Citrate 150 MG TABS, Take 2 tablets by mouth 2 (two) times a day, Disp: 4 tablet, Rfl: 0    levonorgestrel-ethinyl estradiol (Larissia) 0.1-20 MG-MCG per tablet, Take 1 tablet by mouth daily, Disp: 84 tablet, Rfl: 3    mupirocin (BACTROBAN) 2 % ointment, Apply topically 3 (three) times a day, Disp: 22 g, Rfl: 0    omeprazole (PriLOSEC) 40 MG capsule,  ", Disp: , Rfl:     Review of Systems:  Review of Systems   Constitutional:  Negative for chills and fever.   Respiratory:  Negative for shortness of breath.    Cardiovascular:  Negative for chest pain.   Gastrointestinal:  Negative for abdominal distention, abdominal pain, blood in stool, constipation, nausea and vomiting.   Genitourinary:  Negative for difficulty urinating, dyspareunia, dysuria, frequency, menstrual problem, pelvic pain, urgency, vaginal bleeding, vaginal discharge and vaginal pain.         Physical Exam:  /78 (BP Location: Left arm, Patient Position: Sitting, Cuff Size: Standard)   Ht 5' 6\" (1.676 m)   Wt 73.5 kg (162 lb)   LMP 01/22/2024 (Approximate)   BMI 26.15 kg/m²    Physical Exam  Constitutional:       General: She is awake.      Appearance: Normal appearance. She is well-developed.   Genitourinary:      Vulva, bladder and urethral meatus normal.      Right Labia: No rash, tenderness or lesions.     Left Labia: No tenderness, lesions or rash.     No labial fusion noted.      No vaginal discharge, erythema, tenderness or bleeding.      No vaginal prolapse present.     No vaginal atrophy present.       Right Adnexa: not tender, not full and no mass present.     Left Adnexa: not tender, not full and no mass present.     Cervix is nulliparous.      No cervical motion tenderness, discharge, lesion or polyp.      Uterus is not enlarged, tender or irregular.      No uterine mass detected.     No urethral prolapse present.      Bladder is not tender.       Pelvic exam was performed with patient in the lithotomy position.   Breasts:     Right: No inverted nipple, mass, nipple discharge, skin change or tenderness.      Left: No inverted nipple, mass, nipple discharge, skin change or tenderness.   HENT:      Head: Normocephalic and atraumatic.   Cardiovascular:      Rate and Rhythm: Normal rate and regular rhythm.      Heart sounds: Normal heart sounds.   Pulmonary:      Effort: Pulmonary " effort is normal. No tachypnea or respiratory distress.      Breath sounds: Normal breath sounds.   Abdominal:      General: Abdomen is flat. There is no distension.      Palpations: Abdomen is soft.      Tenderness: There is no abdominal tenderness. There is no guarding or rebound.   Musculoskeletal:      Cervical back: Neck supple.   Lymphadenopathy:      Upper Body:      Right upper body: No supraclavicular or axillary adenopathy.      Left upper body: No supraclavicular or axillary adenopathy.   Neurological:      General: No focal deficit present.      Mental Status: She is alert and oriented to person, place, and time.   Psychiatric:         Mood and Affect: Mood normal.         Behavior: Behavior normal.         Thought Content: Thought content normal.         Judgment: Judgment normal.   Vitals reviewed.

## 2024-02-06 LAB
C TRACH DNA SPEC QL NAA+PROBE: NEGATIVE
N GONORRHOEA DNA SPEC QL NAA+PROBE: NEGATIVE

## 2024-02-06 NOTE — RESULT ENCOUNTER NOTE
Clay Medrano,     Your chlamydia and gonorrhea testing is negative. Please contact the office with any questions.

## 2024-02-07 LAB
LAB AP GYN PRIMARY INTERPRETATION: NORMAL
Lab: NORMAL

## 2024-10-04 DIAGNOSIS — Z30.41 ENCOUNTER FOR SURVEILLANCE OF CONTRACEPTIVE PILLS: ICD-10-CM

## 2024-10-04 RX ORDER — LEVONORGESTREL AND ETHINYL ESTRADIOL 0.1-0.02MG
1 KIT ORAL DAILY
Qty: 84 TABLET | Refills: 3 | Status: SHIPPED | OUTPATIENT
Start: 2024-10-04

## 2025-03-05 DIAGNOSIS — Z30.41 ENCOUNTER FOR SURVEILLANCE OF CONTRACEPTIVE PILLS: ICD-10-CM

## 2025-03-07 RX ORDER — LEVONORGESTREL/ETHIN.ESTRADIOL 0.1-0.02MG
1 TABLET ORAL DAILY
Qty: 84 TABLET | Refills: 1 | Status: SHIPPED | OUTPATIENT
Start: 2025-03-07

## 2025-07-23 ENCOUNTER — ANNUAL EXAM (OUTPATIENT)
Dept: OBGYN CLINIC | Facility: CLINIC | Age: 30
End: 2025-07-23
Payer: COMMERCIAL

## 2025-07-23 VITALS
HEIGHT: 66 IN | DIASTOLIC BLOOD PRESSURE: 66 MMHG | WEIGHT: 158 LBS | BODY MASS INDEX: 25.39 KG/M2 | SYSTOLIC BLOOD PRESSURE: 114 MMHG

## 2025-07-23 DIAGNOSIS — E28.2 PCOS (POLYCYSTIC OVARIAN SYNDROME): ICD-10-CM

## 2025-07-23 DIAGNOSIS — Z01.419 WOMEN'S ANNUAL ROUTINE GYNECOLOGICAL EXAMINATION: Primary | ICD-10-CM

## 2025-07-23 DIAGNOSIS — Z30.41 ENCOUNTER FOR SURVEILLANCE OF CONTRACEPTIVE PILLS: ICD-10-CM

## 2025-07-23 DIAGNOSIS — Z11.3 SCREENING FOR STD (SEXUALLY TRANSMITTED DISEASE): ICD-10-CM

## 2025-07-23 PROCEDURE — S0612 ANNUAL GYNECOLOGICAL EXAMINA: HCPCS | Performed by: OBSTETRICS & GYNECOLOGY

## 2025-07-23 PROCEDURE — 87591 N.GONORRHOEAE DNA AMP PROB: CPT | Performed by: OBSTETRICS & GYNECOLOGY

## 2025-07-23 PROCEDURE — 87491 CHLMYD TRACH DNA AMP PROBE: CPT | Performed by: OBSTETRICS & GYNECOLOGY

## 2025-07-23 RX ORDER — LEVONORGESTREL/ETHIN.ESTRADIOL 0.1-0.02MG
1 TABLET ORAL DAILY
Qty: 84 TABLET | Refills: 4 | Status: SHIPPED | OUTPATIENT
Start: 2025-07-23

## 2025-07-23 NOTE — PATIENT INSTRUCTIONS
[email or web address]
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
## 2025-07-24 ENCOUNTER — APPOINTMENT (OUTPATIENT)
Dept: LAB | Facility: AMBULARY SURGERY CENTER | Age: 30
End: 2025-07-24
Payer: COMMERCIAL

## 2025-07-24 DIAGNOSIS — E28.2 PCOS (POLYCYSTIC OVARIAN SYNDROME): ICD-10-CM

## 2025-07-24 LAB
C TRACH DNA SPEC QL NAA+PROBE: NEGATIVE
GLUCOSE P FAST SERPL-MCNC: 86 MG/DL (ref 65–99)
INSULIN SERPL-ACNC: 3.56 UIU/ML (ref 1.9–23)
N GONORRHOEA DNA SPEC QL NAA+PROBE: NEGATIVE

## 2025-07-24 PROCEDURE — 82947 ASSAY GLUCOSE BLOOD QUANT: CPT

## 2025-07-24 PROCEDURE — 83525 ASSAY OF INSULIN: CPT

## 2025-07-24 PROCEDURE — 36415 COLL VENOUS BLD VENIPUNCTURE: CPT

## 2025-08-19 DIAGNOSIS — Z30.41 ENCOUNTER FOR SURVEILLANCE OF CONTRACEPTIVE PILLS: ICD-10-CM

## 2025-08-19 RX ORDER — TIMOLOL MALEATE 5 MG/ML
1 SOLUTION/ DROPS OPHTHALMIC DAILY
Qty: 84 TABLET | Refills: 4 | OUTPATIENT
Start: 2025-08-19